# Patient Record
Sex: FEMALE | Race: WHITE | Employment: FULL TIME | URBAN - NONMETROPOLITAN AREA
[De-identification: names, ages, dates, MRNs, and addresses within clinical notes are randomized per-mention and may not be internally consistent; named-entity substitution may affect disease eponyms.]

---

## 2023-09-22 ENCOUNTER — HOSPITAL ENCOUNTER (OUTPATIENT)
Age: 53
Discharge: HOME | End: 2023-09-22
Payer: COMMERCIAL

## 2023-09-22 LAB
CRP SERPL HS-MCNC: <3 MG/L (ref 0–5)
D DIMER PPP FEU-MCNC: <0.27 UG/ML FEU (ref 0–0.59)
ERYTHROCYTE [SEDIMENTATION RATE] IN BLOOD BY PHOTOMETRIC METHOD: 5 MM/HR (ref 0–30)

## 2023-09-22 PROCEDURE — 85652 RBC SED RATE AUTOMATED: CPT

## 2023-09-22 PROCEDURE — 85379 FIBRIN DEGRADATION QUANT: CPT

## 2023-09-22 PROCEDURE — 36415 COLL VENOUS BLD VENIPUNCTURE: CPT

## 2023-09-22 PROCEDURE — 86140 C-REACTIVE PROTEIN: CPT

## 2024-07-09 ENCOUNTER — HOSPITAL ENCOUNTER (OUTPATIENT)
Age: 54
Discharge: HOME | End: 2024-07-09
Payer: COMMERCIAL

## 2024-07-09 LAB
CHOLEST SERPL-MCNC: 164 MG/DL (ref 0–199)
CHOLESTEROL/HDL RATIO: 3
HDLC SERPL-MCNC: 62 MG/DL
LDLC SERPL CALC-MCNC: 93 MG/DL (ref 0–100)
TRIGL SERPL-MCNC: 48 MG/DL
VLDLC SERPL CALC-MCNC: 10 MG/DL

## 2024-07-09 PROCEDURE — 36415 COLL VENOUS BLD VENIPUNCTURE: CPT

## 2024-07-09 PROCEDURE — 80061 LIPID PANEL: CPT

## 2024-07-16 ENCOUNTER — LAB (OUTPATIENT)
Dept: LAB | Age: 54
End: 2024-07-16

## 2024-07-16 VITALS — OXYGEN SATURATION: 99 % | SYSTOLIC BLOOD PRESSURE: 124 MMHG | HEART RATE: 70 BPM | DIASTOLIC BLOOD PRESSURE: 86 MMHG

## 2024-07-16 DIAGNOSIS — Z01.30 BLOOD PRESSURE CHECK: Primary | ICD-10-CM

## 2024-07-16 NOTE — PROGRESS NOTES
Patient presents today for blood pressure check. Patient reports she recently seen her PCP and had a reading that was higher than her normal. Patient rested for 15 minutes prior to the blood pressure check. Patient reports she was just curious what it is today and works nearby so she came here. Patient denies any new symptoms like headaches, changes in vision, chest pain. Vitals obtained and documented.   /86  HR 70

## 2024-12-11 ENCOUNTER — OFFICE VISIT (OUTPATIENT)
Dept: PRIMARY CARE CLINIC | Age: 54
End: 2024-12-11

## 2024-12-11 VITALS
TEMPERATURE: 98.6 F | HEART RATE: 76 BPM | OXYGEN SATURATION: 99 % | DIASTOLIC BLOOD PRESSURE: 78 MMHG | SYSTOLIC BLOOD PRESSURE: 128 MMHG | HEIGHT: 66 IN | RESPIRATION RATE: 16 BRPM

## 2024-12-11 DIAGNOSIS — L91.8 INFLAMED SKIN TAG: Primary | ICD-10-CM

## 2024-12-11 PROBLEM — L71.9 ROSACEA: Status: ACTIVE | Noted: 2024-12-11

## 2024-12-11 PROBLEM — L80 VITILIGO: Status: ACTIVE | Noted: 2024-12-11

## 2024-12-11 PROBLEM — Z96.649 HISTORY OF TOTAL HIP ARTHROPLASTY: Status: ACTIVE | Noted: 2021-06-28

## 2024-12-11 RX ORDER — IBUPROFEN 200 MG
200 TABLET ORAL EVERY 6 HOURS PRN
COMMUNITY

## 2024-12-11 RX ORDER — LEVONORGESTREL 52 MG/1
1 INTRAUTERINE DEVICE INTRAUTERINE ONCE
COMMUNITY

## 2024-12-11 SDOH — ECONOMIC STABILITY: FOOD INSECURITY: WITHIN THE PAST 12 MONTHS, THE FOOD YOU BOUGHT JUST DIDN'T LAST AND YOU DIDN'T HAVE MONEY TO GET MORE.: NEVER TRUE

## 2024-12-11 SDOH — ECONOMIC STABILITY: FOOD INSECURITY: WITHIN THE PAST 12 MONTHS, YOU WORRIED THAT YOUR FOOD WOULD RUN OUT BEFORE YOU GOT MONEY TO BUY MORE.: NEVER TRUE

## 2024-12-11 SDOH — ECONOMIC STABILITY: INCOME INSECURITY: HOW HARD IS IT FOR YOU TO PAY FOR THE VERY BASICS LIKE FOOD, HOUSING, MEDICAL CARE, AND HEATING?: NOT HARD AT ALL

## 2024-12-11 ASSESSMENT — PATIENT HEALTH QUESTIONNAIRE - PHQ9
SUM OF ALL RESPONSES TO PHQ QUESTIONS 1-9: 0
1. LITTLE INTEREST OR PLEASURE IN DOING THINGS: NOT AT ALL
SUM OF ALL RESPONSES TO PHQ9 QUESTIONS 1 & 2: 0
SUM OF ALL RESPONSES TO PHQ QUESTIONS 1-9: 0
2. FEELING DOWN, DEPRESSED OR HOPELESS: NOT AT ALL

## 2024-12-11 NOTE — PATIENT INSTRUCTIONS
Cryotherapy Wound Care (Liquid Nitrogen Freezing)     Liquid nitrogen is extremely cold (-350° F). When sprayed on or applied to the skin, the top skin layer rapidly freezes.    You will probably feel some stinging and mild pain when the liquid nitrogen is applied, but this will not last long.     When you leave our office, the freezing site will probably be red and swollen, and it may sting, throb, and itch as it thaws. You may take Ibuprofen or Tylenol for the discomfort.  Expect the site to look worse over the next few days before it gets better. Swelling and/or blistering often develop within a couple of hours after treatment. 2-3 days after treatment a scab will probably form which will then take 7-10 days to fall off.  After this, the skin may look normal, or it may be pink and smooth or slightly lighter in color than the surrounding skin.      Instructions:  1. The treatment site may get wet with normal bathing and showering.   2. If a blister or scab forms, do not remove it, but allow it to heal on its own.   3. If crusting develops, cleanse gently with soap and water and apply a thin layer of Vaseline or Bacitracin twice daily.   4. If the site is prone to irritation, cover it with a bandage.    If you have any questions or concerns about the healing of your cryotherapy wound, please do not hesitate to call us at [phone].

## 2024-12-11 NOTE — PROGRESS NOTES
[place] [address]                        Telephone [phone]             Fax [phone]       Caity Stewart  :  1970  Age:  54 y.o.   MRN:  [id number]  Date of visit:  2024       Assessment and Plan:    Inflamed skin tag  The skin tag was treated with cryotherapy as documented.  The patient tolerated this well.    Printed information regarding Cryotherapy Wound Care was provided to the patient with the after visit summary.     She was advised to follow up if symptoms worsen or do not resolve.     - DESTRUC BENIGN LESION, UP TO 14 LESIONS            Subjective:    Caity Stewart is a 54 y.o. female who presents to [facility] today (2024) for evaluation of:  Skin Tag (Skin tag on neck, would like removed)      History of Present Illness  The patient presents for evaluation of a skin tag.    She reports the presence of a persistent skin tag that frequently becomes entangled with seatbelts and necklaces. This issue has been ongoing for an extended period, with the first incident of entanglement occurring last week. The skin tag has not exhibited any signs of healing and continues to cause discomfort due to its recurrent entanglement. She describes the area as being sensitive to touch. She has not applied any topical treatments to the affected area. Additionally, she mentions a previous experience of having a similar skin tag on her back treated with liquid nitrogen.          She has the following problem list:  Patient Active Problem List   Diagnosis    History of total hip arthroplasty    Rosacea    Vitiligo        Current medications are:  Current Outpatient Medications   Medication Sig Dispense Refill    levonorgestrel (MIRENA, 52 MG,) IUD 52 mg 1 each by IntraUTERine route once      ibuprofen (ADVIL;MOTRIN) 200 MG tablet Take 1 tablet by mouth every 6 hours as needed 2-3 tablets